# Patient Record
Sex: MALE | Race: WHITE | Employment: FULL TIME | URBAN - METROPOLITAN AREA
[De-identification: names, ages, dates, MRNs, and addresses within clinical notes are randomized per-mention and may not be internally consistent; named-entity substitution may affect disease eponyms.]

---

## 2024-06-17 ENCOUNTER — OFFICE VISIT (OUTPATIENT)
Dept: ENT CLINIC | Age: 34
End: 2024-06-17
Payer: COMMERCIAL

## 2024-06-17 VITALS
DIASTOLIC BLOOD PRESSURE: 73 MMHG | BODY MASS INDEX: 39.17 KG/M2 | HEIGHT: 75 IN | WEIGHT: 315 LBS | SYSTOLIC BLOOD PRESSURE: 126 MMHG | HEART RATE: 78 BPM | OXYGEN SATURATION: 96 % | TEMPERATURE: 98.2 F

## 2024-06-17 DIAGNOSIS — H91.93 BILATERAL HEARING LOSS, UNSPECIFIED HEARING LOSS TYPE: Chronic | ICD-10-CM

## 2024-06-17 DIAGNOSIS — H93.13 SUBJECTIVE TINNITUS OF BOTH EARS: Chronic | ICD-10-CM

## 2024-06-17 DIAGNOSIS — H69.93 EUSTACHIAN TUBE DYSFUNCTION, BILATERAL: Primary | ICD-10-CM

## 2024-06-17 PROCEDURE — 99203 OFFICE O/P NEW LOW 30 MIN: CPT | Performed by: OTOLARYNGOLOGY

## 2024-06-17 RX ORDER — CARVEDILOL 25 MG/1
25 TABLET ORAL 2 TIMES DAILY
COMMUNITY

## 2024-06-17 RX ORDER — LOSARTAN POTASSIUM AND HYDROCHLOROTHIAZIDE 25; 100 MG/1; MG/1
1 TABLET ORAL DAILY
COMMUNITY
Start: 2024-01-17

## 2024-06-17 RX ORDER — DILTIAZEM HYDROCHLORIDE 120 MG/1
120 TABLET, FILM COATED ORAL 2 TIMES DAILY
COMMUNITY

## 2024-06-17 NOTE — PROGRESS NOTES
[facility]   DIVISION OF OTOLARYNGOLOGY- HEAD & NECK SURGERY  [facility]           NEW PATIENT VISIT      PCP:  No primary care provider on file.      REFERRED BY:   self         CHIEF COMPLAINT    Chief Complaint   Patient presents with    Otalgia    head pressure        HISTORY OF PRESENT ILLNESS      Ryan North is a 34 y.o. male who presented today for evaluation and management for the above chief complaint.  \"Ear pain and a lot of head pressure, will kind of clear up with ear inflation.  Going on for two months.  Went to [facility], in [facility], about two months ago.  She told me I have fluid behind my ear drums.  Treatment with antibiotics and ibuprofen and some other kind of medication and said if it didn't go away to follow up with specialists.  Work in [employer].  Pressure is in the ears and will go away if do ear inflation  but also had pressure in forehead and sides of head.  Have had yellow drainage from the ear often when I clean my ears.  I'm always stuffy [nose].  I have a deviated septum.  Have trouble breathing through nose both sides, right side or left side at different times.  I hear popping and clicking when I swallow.  Had three sets of tubes as a child, most recent at age 9.  Seasonal allergies, summer.  Got steroid shot beginning of current month, helped allergies but had side effects, cold sweats, racing heart beat, elevated blood pressure, sweating.  Get one once a year since 19 years old.  Tested for allergies long time ago but never on immunotherapy.  Use CPAP for sleep apnea.  Denied weakness paralysis or numbness.  After lay down the pressure goes away, did maneuver and pressure went away and then had a headache in center of forehead for 45 minutes.  Cousin has stage 4 a glioblastoma       REVIEW OF SYSTEMS   Review of Systems   Constitutional:  Negative for chills and fever.   HENT:  Negative for congestion, ear discharge, ear pain, hearing loss (wife

## 2024-06-17 NOTE — PATIENT INSTRUCTIONS
EUSTACHIAN TUBE DYSFUNCTION MEASURES    Please do the following to attempt to improve your Eustachian tube dysfunction  and/or to help to prevent the fluid in your middle ear:  1.  Auto inflate your ears as instructed ( hold your nose closed, with your mouth closed and blow out as if blowing ear into or out of ears.  If not successful, then swallow while doing the auto inflation maneuver and maintaining the pressure) four times daily for 10 days, and then three times daily and as needed for ear congestion.     2.  Take one Mucinex (blue box) maximum strength 1200 mg tablet (or two 600 regular strength tablets)every 12 hours, daily for the next 14 days. (OTC)      3.  Use 0.05% Oxymetazoline (eg. Afrin, Duration, 4-Way) 12 hour decongestant nasal solution, with two sprays in each nostril three times a day for three days, then twice a day for two days, then stop for two days and then repeat the cycle once.    4.  Use fluticasone nasal spray (generic Flonase), 2 sprays in each nostril once daily.      ======================================================================      NO Q-TIPS OR OTHER INSTRUMENTS/OBJECTS IN THE EARS   You should never clean your ears with a Q-tip, cotton tipped applicator, Kate pin, paper clip, pen cap, nail file, or any other instrument.  This will tend to push wax in deeper and pack the ear canal with wax.  There is a high risk and danger of this practice, especially rupture of ear drum, dislocation or other damage to ossicles, and permanent, irreversible, and irreparable hearing loss and/or dizziness.  I recommend only use of one the several ear wax removal kits available \"over the counter\" if you feel a need to try to remove ear wax.  For example, Murine, [employer], NeilMed, or Debrox ear wax removal kits may be used for ear wax removal, as needed.  No other methods should be self used for cleaning your ears. NO Q-TIPS OR OTHER INSTRUMENTS/OBJECTS IN THE EARS   You should never